# Patient Record
Sex: FEMALE | Race: WHITE | Employment: PART TIME | URBAN - METROPOLITAN AREA
[De-identification: names, ages, dates, MRNs, and addresses within clinical notes are randomized per-mention and may not be internally consistent; named-entity substitution may affect disease eponyms.]

---

## 2018-02-17 ENCOUNTER — OFFICE VISIT (OUTPATIENT)
Dept: URGENT CARE | Facility: URGENT CARE | Age: 38
End: 2018-02-17
Payer: COMMERCIAL

## 2018-02-17 VITALS
RESPIRATION RATE: 22 BRPM | SYSTOLIC BLOOD PRESSURE: 139 MMHG | TEMPERATURE: 101.1 F | WEIGHT: 200.25 LBS | DIASTOLIC BLOOD PRESSURE: 85 MMHG | BODY MASS INDEX: 36.63 KG/M2 | OXYGEN SATURATION: 100 % | HEART RATE: 106 BPM

## 2018-02-17 DIAGNOSIS — R50.9 FEVER AND CHILLS: ICD-10-CM

## 2018-02-17 DIAGNOSIS — J45.31 MILD PERSISTENT ASTHMA WITH EXACERBATION: ICD-10-CM

## 2018-02-17 DIAGNOSIS — B34.9 VIRAL ILLNESS: Primary | ICD-10-CM

## 2018-02-17 LAB
FLUAV+FLUBV AG SPEC QL: NEGATIVE
FLUAV+FLUBV AG SPEC QL: NEGATIVE
SPECIMEN SOURCE: NORMAL

## 2018-02-17 PROCEDURE — 87804 INFLUENZA ASSAY W/OPTIC: CPT | Performed by: NURSE PRACTITIONER

## 2018-02-17 PROCEDURE — 99203 OFFICE O/P NEW LOW 30 MIN: CPT | Performed by: NURSE PRACTITIONER

## 2018-02-17 RX ORDER — ALBUTEROL SULFATE 90 UG/1
1-2 AEROSOL, METERED RESPIRATORY (INHALATION) EVERY 6 HOURS PRN
Qty: 1 INHALER | Refills: 0 | Status: SHIPPED | OUTPATIENT
Start: 2018-02-17

## 2018-02-17 RX ORDER — DEXTROMETHORPHAN POLISTIREX 30 MG/5ML
60 SUSPENSION ORAL 2 TIMES DAILY
Qty: 148 ML | Refills: 0 | Status: SHIPPED | OUTPATIENT
Start: 2018-02-17

## 2018-02-17 NOTE — MR AVS SNAPSHOT
"              After Visit Summary   2/17/2018    Nevaeh Quiñonez    MRN: [id number]           Patient Information     Date Of Birth          1980        Visit Information        Provider Department      2/17/2018 12:45 PM Dionicio Mujica APRN [facility]        Today's Diagnoses     Viral illness    -  1    Fever and chills        Mild persistent asthma with exacerbation          Care Instructions      Viral Syndrome (Adult)  A viral illness may cause a number of symptoms. The symptoms depend on the part of the body that the virus affects. If it settles in your nose, throat, and lungs, it may cause cough, sore throat, congestion, and sometimes headache. If it settles in your stomach and intestinal tract, it may cause vomiting and diarrhea. Sometimes it causes vague symptoms like \"aching all over,\" feeling tired, loss of appetite, or fever.  A viral illness usually lasts 1 to 2 weeks, but sometimes it lasts longer. In some cases, a more serious infection can look like a viral syndrome in the first few days of the illness. You may need another exam and additional tests to know the difference. Watch for the warning signs listed below.  Home care  Follow these guidelines for taking care of yourself at home:    If symptoms are severe, rest at home for the first 2 to 3 days.    Stay away from cigarette smoke - both your smoke and the smoke from others.    You may use over-the-counter acetaminophen or ibuprofen for fever, muscle aching, and headache, unless another medicine was prescribed for this. If you have chronic liver or kidney disease or ever had a stomach ulcer or GI bleeding, talk with your doctor before using these medicines. No one who is younger than 18 and ill with a fever should take aspirin. It may cause severe disease or death.    Your appetite may be poor, so a light diet is fine. Avoid dehydration by drinking 8 to 12 8-ounce glasses of fluids each " day. This may include water; orange juice; lemonade; apple, grape, and cranberry juice; clear fruit drinks; electrolyte replacement and sports drinks; and decaffeinated teas and coffee. If you have been diagnosed with a kidney disease, ask your doctor how much and what types of fluids you should drink to prevent dehydration. If you have kidney disease, drinking too much fluid can cause it build up in the your body and be dangerous to your health.    Over-the-counter remedies won't shorten the length of the illness but may be helpful for cough, sore throat; and nasal and sinus congestion. Don't use decongestants if you have high blood pressure.  Follow-up care  Follow up with your healthcare provider if you do not improve over the next week.  Call 911  Get emergency medical care if any of the following occur:    Convulsion    Feeling weak, dizzy, or like you are going to faint    Chest pain, shortness of breath, wheezing, or difficulty breathing  When to seek medical advice  Call your healthcare provider right away if any of these occur:    Cough with lots of colored sputum (mucus) or blood in your sputum    Chest pain, shortness of breath, wheezing, or difficulty breathing    Severe headache; face, neck, or ear pain    Severe, constant pain in the lower right side of your belly (abdominal)    Continued vomiting (can t keep liquids down)    Frequent diarrhea (more than 5 times a day); blood (red or black color) or mucus in diarrhea    Feeling weak, dizzy, or like you are going to faint    Extreme thirst    Fever of 100.4 F (38 C) or higher, or as directed by your healthcare provider  Date Last Reviewed: 9/25/2015 2000-2017 [employer]. [address] [place] [address], [place] [address]. All rights reserved. This information is not intended as a substitute for professional medical care. Always follow your healthcare professional's instructions.                Follow-ups after your visit        Follow-up notes  "from your care team     Return if symptoms worsen or fail to improve, for follow up with PCP .      Who to contact     If you have questions or need follow up information about today's clinic visit or your schedule please contact [place] [facility] directly at [phone].  Normal or non-critical lab and imaging results will be communicated to you by MyChart, letter or phone within 4 business days after the clinic has received the results. If you do not hear from us within 7 days, please contact the clinic through MyChart or phone. If you have a critical or abnormal lab result, we will notify you by phone as soon as possible.  Submit refill requests through [employer] or call your pharmacy and they will forward the refill request to us. Please allow 3 business days for your refill to be completed.          Additional Information About Your Visit        MyChart Information     [employer] lets you send messages to your doctor, view your test results, renew your prescriptions, schedule appointments and more. To sign up, go to [email or web address] . Click on \"Log in\" on the left side of the screen, which will take you to the Welcome page. Then click on \"Sign up Now\" on the right side of the page.     You will be asked to enter the access code listed below, as well as some personal information. Please follow the directions to create your username and password.     Your access code is: [email or web address]  Expires: 2018  3:30 PM     Your access code will  in 90 days. If you need help or a new code, please call your [facility] clinic or [phone].        Care EveryWhere ID     This is your Care EveryWhere ID. This could be used by other organizations to access your [facility] medical records  [id number]        Your Vitals Were     Pulse Temperature Respirations Pulse Oximetry BMI (Body Mass Index)       106 101.1  F (38.4  C) (Oral) 22 100% 36.63 kg/m2        Blood Pressure from Last 3 Encounters: "   02/17/18 139/85   12/27/12 101/54   05/10/06 94/64    Weight from Last 3 Encounters:   02/17/18 200 lb 4 oz (90.8 kg)   12/27/12 180 lb (81.6 kg)   05/10/06 177 lb (80.3 kg)              We Performed the Following     Influenza A/B antigen          Today's Medication Changes          These changes are accurate as of 2/17/18  3:31 PM.  If you have any questions, ask your nurse or doctor.               Start taking these medicines.        Dose/Directions    albuterol 108 (90 BASE) MCG/ACT Inhaler   Commonly known as:  PROAIR HFA/PROVENTIL HFA/VENTOLIN HFA   Used for:  Viral illness, Mild persistent asthma with exacerbation   Started by:  Dionicio Mujica APRN CNP        Dose:  1-2 puff   Inhale 1-2 puffs into the lungs every 6 hours as needed for shortness of breath / dyspnea (cough)   Quantity:  1 Inhaler   Refills:  0       dextromethorphan 30 MG/5ML liquid   Commonly known as:  DELSYM   Used for:  Viral illness   Started by:  Dionicio Mujica APRN CNP        Dose:  60 mg   Take 10 mLs (60 mg) by mouth 2 times daily   Quantity:  148 mL   Refills:  0       naproxen 375 MG tablet   Commonly known as:  NAPROSYN   Used for:  Viral illness   Started by:  Dionicio Mujica APRN CNP        Dose:  375 mg   Take 1 tablet (375 mg) by mouth 2 times daily (with meals)   Quantity:  20 tablet   Refills:  0         Stop taking these medicines if you haven't already. Please contact your care team if you have questions.     guaiFENesin-codeine 100-10 MG/5ML Soln solution   Commonly known as:  ROBITUSSIN AC   Stopped by:  Dionicio Mujica APRN CNP           PREDNISONE PO   Stopped by:  Dionicio Mujica APRN CNP                Where to get your medicines      These medications were sent to [facility] [address] [address]     Phone:  [phone]     albuterol 108 (90 BASE) MCG/ACT Inhaler    dextromethorphan 30 MG/5ML  liquid    fluticasone-salmeterol 500-50 MCG/DOSE diskus inhaler    naproxen 375 MG tablet                Primary Care Provider Office Phone # Fax #    Betty Mays [phone]       [address] [address]        Equal Access to Services     ASHA TOVAR : Hadii aad ku hadasho Soomaali, waaxda luqadaha, qaybta kaalmada adeegyada, waxay shwethain hayaan aderenata xiangfabby payan . So [facility] [phone].    ATENCIÓN: Si habla español, tiene a swain disposición servicios gratuitos de asistencia lingüística. Kasandra al [phone].    We comply with applicable federal civil rights laws and [place] laws. We do not discriminate on the basis of race, color, national origin, age, disability, sex, sexual orientation, or gender identity.            Thank you!     Thank you for choosing [place] [facility]  for your care. Our goal is always to provide you with excellent care. Hearing back from our patients is one way we can continue to improve our services. Please take a few minutes to complete the written survey that you may receive in the mail after your visit with us. Thank you!             Your Updated Medication List - Protect others around you: Learn how to safely use, store and throw away your medicines at www.disposemymeds.org.          This list is accurate as of 2/17/18  3:31 PM.  Always use your most recent med list.                   Brand Name Dispense Instructions for use Diagnosis    acetaminophen 500 MG tablet    TYLENOL    1 tablet    Take 1-2 tablets by mouth every 6 hours as needed for pain.        albuterol 108 (90 BASE) MCG/ACT Inhaler    PROAIR HFA/PROVENTIL HFA/VENTOLIN HFA    1 Inhaler    Inhale 1-2 puffs into the lungs every 6 hours as needed for shortness of breath / dyspnea (cough)    Viral illness, Mild persistent asthma with exacerbation       albuterol 90 MCG/ACT inhaler     1    1-2 puffs Q 4-6 hrs prn    Supervision of other  normal pregnancy, Mild persistent asthma with exacerbation       dextromethorphan 30 MG/5ML liquid    DELSYM    148 mL    Take 10 mLs (60 mg) by mouth 2 times daily    Viral illness       fluticasone-salmeterol 500-50 MCG/DOSE diskus inhaler    ADVAIR    60 Inhaler    Inhale 1 puff into the lungs every 12 hours    Mild persistent asthma with exacerbation       ibuprofen 200 MG tablet    ADVIL/MOTRIN    1 tablet    Take 4 tablets by mouth every 8 hours as needed for pain.        METFORMIN HCL PO      Take  by mouth daily (with breakfast).        naproxen 375 MG tablet    NAPROSYN    20 tablet    Take 1 tablet (375 mg) by mouth 2 times daily (with meals)    Viral illness       PULMICORT IN      Inhale  into the lungs.

## 2018-02-17 NOTE — PROGRESS NOTES
SUBJECTIVE: Nevaeh Quiñonez is a 37 year old female who present complaining of flu-like symptoms: fevers, chills, myalgias, congestion, headache, sore throat, and cough for 4 days. Denies dyspnea or wheezing. Son with influenza positive.   LMP: 01/20/2018. Reports increased fatigue, decreased appetite, and disruption in sleep due to cough; denies any dysuria or diarrhea.    OBJECTIVE:  /85  Pulse 106  Temp 101.1  F (38.4  C) (Oral)  Resp 22  Wt 200 lb 4 oz (90.8 kg)  SpO2 100%  BMI 36.63 kg/m2  Appears moderately ill but not toxic; temperature as noted in vitals. Ears normal. Throat and pharynx normal.  Neck supple. No adenopathy in the neck. Sinuses non tender. CV: S1, S2 auscultated with regular rate and rhythm, no gallops, murmurs or rubs. Lungs: Bilateral lobes clear throughout without wheezes, crackles, or rhonchi noted.   Rapid Flu: negative    ASSESSMENT: Viral illness    PLAN: Discussed with patient causes for viral illnesses. Advised process for strep throat culture and further symptomatic cares with warm saltwater gargle, use acetaminophen or other OTC analgesic, fever control, and OTC saline nasal spray. Advised that there are no symptoms of pneumonia or ear infection and when to return re-evaluation.      Advised to return to follow up with PCP if symptoms persist or do not improve as discussed. Patient agreed to the plan of care.       Russell Mujica, APRN, CNP

## 2018-02-17 NOTE — LETTER
[facility]  [facility]    [address] [address]  Phone: [phone]    February 17, 2018      RE: Nevaeh Quiñonez  [address] [address] [id number]  [place] [address]       To whom it may concern:    Nevaeh Quiñonez was seen in our clinic today. She may return to work on 02/21/2018.      Sincerely,    Dionicio FARFAN, CNP

## 2018-02-17 NOTE — PATIENT INSTRUCTIONS

## 2018-06-16 ENCOUNTER — OFFICE VISIT (OUTPATIENT)
Dept: URGENT CARE | Facility: URGENT CARE | Age: 38
End: 2018-06-16
Payer: COMMERCIAL

## 2018-06-16 VITALS
WEIGHT: 210 LBS | SYSTOLIC BLOOD PRESSURE: 130 MMHG | HEART RATE: 72 BPM | RESPIRATION RATE: 20 BRPM | TEMPERATURE: 97.9 F | BODY MASS INDEX: 38.41 KG/M2 | DIASTOLIC BLOOD PRESSURE: 80 MMHG | OXYGEN SATURATION: 100 %

## 2018-06-16 DIAGNOSIS — J45.31 MILD PERSISTENT ASTHMA WITH ACUTE EXACERBATION: Primary | ICD-10-CM

## 2018-06-16 PROCEDURE — 99214 OFFICE O/P EST MOD 30 MIN: CPT | Mod: 25 | Performed by: PHYSICIAN ASSISTANT

## 2018-06-16 PROCEDURE — 94640 AIRWAY INHALATION TREATMENT: CPT | Performed by: PHYSICIAN ASSISTANT

## 2018-06-16 RX ORDER — PREDNISONE 20 MG/1
20 TABLET ORAL DAILY
Qty: 5 TABLET | Refills: 0 | Status: SHIPPED | OUTPATIENT
Start: 2018-06-16

## 2018-06-16 RX ORDER — ALBUTEROL SULFATE 90 UG/1
2 AEROSOL, METERED RESPIRATORY (INHALATION) EVERY 6 HOURS PRN
Qty: 1 INHALER | Refills: 0 | Status: SHIPPED | OUTPATIENT
Start: 2018-06-16

## 2018-06-16 RX ORDER — IPRATROPIUM BROMIDE AND ALBUTEROL SULFATE 2.5; .5 MG/3ML; MG/3ML
1 SOLUTION RESPIRATORY (INHALATION) ONCE
Qty: 1 VIAL | Refills: 0
Start: 2018-06-16 | End: 2018-06-16

## 2018-06-16 NOTE — PROGRESS NOTES
CHIEF COMPLAINT:   Chief Complaint   Patient presents with     Asthma     coughing,wheezing for past week,pt is out of inhaler     SUBJECTIVE:   Nevaeh Quiñonez is a 38 year old female seen urgently with exacerbation of asthma for 7 days. Wheezing is described as moderate and worse at night. Associated symptoms:allergy symptoms. Current asthma medications: Advair (ran out 5 days ago) and albuterol.  Patient denies smoking cigarettes.    Current Outpatient Prescriptions   Medication     acetaminophen (TYLENOL) 500 MG tablet     albuterol (PROAIR HFA/PROVENTIL HFA/VENTOLIN HFA) 108 (90 Base) MCG/ACT Inhaler     albuterol (PROAIR HFA/PROVENTIL HFA/VENTOLIN HFA) 108 (90 BASE) MCG/ACT Inhaler     ALBUTEROL 90 MCG/ACT IN AERS     Budesonide (PULMICORT IN)     dextromethorphan (DELSYM) 30 MG/5ML liquid     fluticasone-salmeterol (ADVAIR) 500-50 MCG/DOSE diskus inhaler     fluticasone-salmeterol (ADVAIR) 500-50 MCG/DOSE diskus inhaler     ibuprofen (ADVIL,MOTRIN) 200 MG tablet     ipratropium - albuterol 0.5 mg/2.5 mg/3 mL (DUONEB) 0.5-2.5 (3) MG/3ML neb solution     METFORMIN HCL PO     naproxen (NAPROSYN) 375 MG tablet     predniSONE (DELTASONE) 20 MG tablet     No current facility-administered medications for this visit.      Family History   Problem Relation Age of Onset     Respiratory Mother      Asthma     Respiratory Father      Asthma     Respiratory Brother      Asthma     Psychotic Disorder Brother      Drugs and ETOH     Social History     Social History     Marital status:      Spouse name: Arthur     Number of children: 1     Years of education: 11     Occupational History      Unemployed     Social History Main Topics     Smoking status: Never Smoker     Smokeless tobacco: Never Used     Alcohol use No     Drug use: No     Sexual activity: Yes     Partners: Male     Other Topics Concern     Not on file     Social History Narrative       OBJECTIVE:   GENERAL:  Alert. No acute distress. WD  WN  HEAD:  Normocephalic.Atramautic  EYES:  PERRLA.  EOMs are full.  Sclerae are clear. No discharge  EARS:  Normal canal without edema exudates or discharge. TM normal. No bulging or retraction  NOSE: Pink and Moist mucous membranes. No discharge B/L.  MOUTH/THROAT:  Oral hygene is good. Moist mucus membranes. No oral or pharyngeal lesions are seen. Oropharynx without exudates edema or erythema  NECK:  Supple.  No lymphadenopathy. ROM intact without nuchal rigidity.  LUNGS: Expiratory wheezing noted throughout  HEART:  Regular rhythm.  Normal rate.  No murmur  EXTREMITIES:  Warm, well perfused with good ROM and strength. No cyanosis or edema.     SKIN:  Warm and dry.  No rashes  NEUROLOGIC:  Normal tone      ASSESSMENT:   Asthma - acute exacerbation    PLAN:   oximetry on room air was 100%, gave duo neb in office  after office therapy, chest was clear    RX per orders - Use bronchodilator MDI 2 puff q4h prn, steroid MDI regularly to prevent asthma and oral steroid taper.    Additional suggestions to patient: push fluids and rest.    Deya Modi PA-C

## 2018-06-16 NOTE — MR AVS SNAPSHOT
After Visit Summary   6/16/2018    Nevaeh Quiñonez    MRN: [id number]           Patient Information     Date Of Birth          1980        Visit Information        Provider Department      6/16/2018 2:40 PM Deya Modi PA-C [facility]        Today's Diagnoses     Mild persistent asthma with acute exacerbation    -  1       Follow-ups after your visit        Who to contact     If you have questions or need follow up information about today's clinic visit or your schedule please contact [facility] directly at [phone].  Normal or non-critical lab and imaging results will be communicated to you by MyChart, letter or phone within 4 business days after the clinic has received the results. If you do not hear from us within 7 days, please contact the clinic through MyChart or phone. If you have a critical or abnormal lab result, we will notify you by phone as soon as possible.  Submit refill requests through goBramble or call your pharmacy and they will forward the refill request to us. Please allow 3 business days for your refill to be completed.          Additional Information About Your Visit        Care EveryWhere ID     This is your Care EveryWhere ID. This could be used by other organizations to access your [place] medical records  [id number]        Your Vitals Were     Pulse Temperature Respirations Pulse Oximetry BMI (Body Mass Index)       72 97.9  F (36.6  C) (Oral) 20 100% 38.41 kg/m2        Blood Pressure from Last 3 Encounters:   06/16/18 130/80   02/17/18 139/85   12/27/12 101/54    Weight from Last 3 Encounters:   06/16/18 210 lb (95.3 kg)   02/17/18 200 lb 4 oz (90.8 kg)   12/27/12 180 lb (81.6 kg)              We Performed the Following     ALBUTEROL UNIT DOSE, 1 MG -      ALBUTEROL/IPRATROPIUM 3ML NEB     INHALATION/NEBULIZER TREATMENT, INITIAL          Today's Medication Changes          These  changes are accurate as of 6/16/18  4:57 PM.  If you have any questions, ask your nurse or doctor.               Start taking these medicines.        Dose/Directions    ipratropium - albuterol 0.5 mg/2.5 mg/3 mL 0.5-2.5 (3) MG/3ML neb solution   Commonly known as:  DUONEB   Used for:  Mild persistent asthma with acute exacerbation   Started by:  Deya Modi PA-C        Dose:  1 vial   Take 1 vial (3 mLs) by nebulization once for 1 dose   Quantity:  1 vial   Refills:  0       predniSONE 20 MG tablet   Commonly known as:  DELTASONE   Used for:  Mild persistent asthma with acute exacerbation   Started by:  Deya Modi PA-C        Dose:  20 mg   Take 1 tablet (20 mg) by mouth daily   Quantity:  5 tablet   Refills:  0         These medicines have changed or have updated prescriptions.        Dose/Directions    * albuterol 108 (90 Base) MCG/ACT Inhaler   Commonly known as:  PROAIR HFA/PROVENTIL HFA/VENTOLIN HFA   This may have changed:  Another medication with the same name was added. Make sure you understand how and when to take each.   Used for:  Viral illness, Mild persistent asthma with exacerbation   Changed by:  Deya Modi PA-C        Dose:  1-2 puff   Inhale 1-2 puffs into the lungs every 6 hours as needed for shortness of breath / dyspnea (cough)   Quantity:  1 Inhaler   Refills:  0       * albuterol 108 (90 Base) MCG/ACT Inhaler   Commonly known as:  PROAIR HFA/PROVENTIL HFA/VENTOLIN HFA   This may have changed:  You were already taking a medication with the same name, and this prescription was added. Make sure you understand how and when to take each.   Used for:  Mild persistent asthma with acute exacerbation   Changed by:  Deya Modi PA-C        Dose:  2 puff   Inhale 2 puffs into the lungs every 6 hours as needed for shortness of breath / dyspnea or wheezing   Quantity:  1 Inhaler   Refills:  0       * fluticasone-salmeterol 500-50 MCG/DOSE diskus inhaler    Commonly known as:  ADVAIR   This may have changed:  Another medication with the same name was added. Make sure you understand how and when to take each.   Used for:  Mild persistent asthma with exacerbation   Changed by:  Deya Modi PA-C        Dose:  1 puff   Inhale 1 puff into the lungs every 12 hours   Quantity:  60 Inhaler   Refills:  0       * fluticasone-salmeterol 500-50 MCG/DOSE diskus inhaler   Commonly known as:  ADVAIR   This may have changed:  You were already taking a medication with the same name, and this prescription was added. Make sure you understand how and when to take each.   Used for:  Mild persistent asthma with acute exacerbation   Changed by:  Deya Modi PA-C        Dose:  1 puff   Inhale 1 puff into the lungs 2 times daily   Quantity:  1 Inhaler   Refills:  1       * Notice:  This list has 4 medication(s) that are the same as other medications prescribed for you. Read the directions carefully, and ask your doctor or other care provider to review them with you.         Where to get your medicines      These medications were sent to [address] [address]     Phone:  [phone]     albuterol 108 (90 Base) MCG/ACT Inhaler    fluticasone-salmeterol 500-50 MCG/DOSE diskus inhaler    predniSONE 20 MG tablet         Some of these will need a paper prescription and others can be bought over the counter.  Ask your nurse if you have questions.     You don't need a prescription for these medications     ipratropium - albuterol 0.5 mg/2.5 mg/3 mL 0.5-2.5 (3) MG/3ML neb solution                Primary Care Provider Fax #    Physician No Ref-Primary [phone]       No address on file        Equal Access to Services     ASHA TOVAR : Mandie Taylor, andrés nugent, awais peng, sharyn sibley. So [facility] [phone].    ATENCIÓN: Otoniel moreland  español, tiene a swain disposición servicios gratuitos de asistencia lingüística. Kasandra salgado [phone].    We comply with applicable federal civil rights laws and [place] laws. We do not discriminate on the basis of race, color, national origin, age, disability, sex, sexual orientation, or gender identity.            Thank you!     Thank you for choosing [facility]  for your care. Our goal is always to provide you with excellent care. Hearing back from our patients is one way we can continue to improve our services. Please take a few minutes to complete the written survey that you may receive in the mail after your visit with us. Thank you!             Your Updated Medication List - Protect others around you: Learn how to safely use, store and throw away your medicines at www.disposemymeds.org.          This list is accurate as of 6/16/18  4:57 PM.  Always use your most recent med list.                   Brand Name Dispense Instructions for use Diagnosis    acetaminophen 500 MG tablet    TYLENOL    1 tablet    Take 1-2 tablets by mouth every 6 hours as needed for pain.        * albuterol 108 (90 Base) MCG/ACT Inhaler    PROAIR HFA/PROVENTIL HFA/VENTOLIN HFA    1 Inhaler    Inhale 1-2 puffs into the lungs every 6 hours as needed for shortness of breath / dyspnea (cough)    Viral illness, Mild persistent asthma with exacerbation       * albuterol 108 (90 Base) MCG/ACT Inhaler    PROAIR HFA/PROVENTIL HFA/VENTOLIN HFA    1 Inhaler    Inhale 2 puffs into the lungs every 6 hours as needed for shortness of breath / dyspnea or wheezing    Mild persistent asthma with acute exacerbation       albuterol 90 MCG/ACT inhaler     1    1-2 puffs Q 4-6 hrs prn    Supervision of other normal pregnancy, Mild persistent asthma with exacerbation       dextromethorphan 30 MG/5ML liquid    DELSYM    148 mL    Take 10 mLs (60 mg) by mouth 2 times daily    Viral illness       * fluticasone-salmeterol 500-50  MCG/DOSE diskus inhaler    ADVAIR    60 Inhaler    Inhale 1 puff into the lungs every 12 hours    Mild persistent asthma with exacerbation       * fluticasone-salmeterol 500-50 MCG/DOSE diskus inhaler    ADVAIR    1 Inhaler    Inhale 1 puff into the lungs 2 times daily    Mild persistent asthma with acute exacerbation       ibuprofen 200 MG tablet    ADVIL/MOTRIN    1 tablet    Take 4 tablets by mouth every 8 hours as needed for pain.        ipratropium - albuterol 0.5 mg/2.5 mg/3 mL 0.5-2.5 (3) MG/3ML neb solution    DUONEB    1 vial    Take 1 vial (3 mLs) by nebulization once for 1 dose    Mild persistent asthma with acute exacerbation       METFORMIN HCL PO      Take  by mouth daily (with breakfast).        naproxen 375 MG tablet    NAPROSYN    20 tablet    Take 1 tablet (375 mg) by mouth 2 times daily (with meals)    Viral illness       predniSONE 20 MG tablet    DELTASONE    5 tablet    Take 1 tablet (20 mg) by mouth daily    Mild persistent asthma with acute exacerbation       PULMICORT IN      Inhale  into the lungs.        * Notice:  This list has 4 medication(s) that are the same as other medications prescribed for you. Read the directions carefully, and ask your doctor or other care provider to review them with you.

## 2022-02-03 ENCOUNTER — HOSPITAL ENCOUNTER (EMERGENCY)
Facility: CLINIC | Age: 42
Discharge: HOME | End: 2022-02-03
Attending: EMERGENCY MEDICINE | Admitting: EMERGENCY MEDICINE
Payer: COMMERCIAL

## 2022-02-03 ENCOUNTER — APPOINTMENT (OUTPATIENT)
Dept: ULTRASOUND IMAGING | Facility: CLINIC | Age: 42
End: 2022-02-03
Attending: EMERGENCY MEDICINE
Payer: COMMERCIAL

## 2022-02-03 VITALS
BODY MASS INDEX: 39.75 KG/M2 | HEIGHT: 62 IN | WEIGHT: 216 LBS | DIASTOLIC BLOOD PRESSURE: 76 MMHG | RESPIRATION RATE: 16 BRPM | SYSTOLIC BLOOD PRESSURE: 137 MMHG | HEART RATE: 75 BPM | TEMPERATURE: 98.3 F | OXYGEN SATURATION: 100 %

## 2022-02-03 DIAGNOSIS — I82.452 ACUTE DEEP VEIN THROMBOSIS (DVT) OF LEFT PERONEAL VEIN (H): ICD-10-CM

## 2022-02-03 LAB
ERYTHROCYTE [DISTWIDTH] IN BLOOD BY AUTOMATED COUNT: 13.4 % (ref 10–15)
HCT VFR BLD AUTO: 42 % (ref 35–47)
HGB BLD-MCNC: 13.2 G/DL (ref 11.7–15.7)
MCH RBC QN AUTO: 27.6 PG (ref 26.5–33)
MCHC RBC AUTO-ENTMCNC: 31.4 G/DL (ref 31.5–36.5)
MCV RBC AUTO: 88 FL (ref 78–100)
PLATELET # BLD AUTO: 335 10E3/UL (ref 150–450)
RBC # BLD AUTO: 4.78 10E6/UL (ref 3.8–5.2)
WBC # BLD AUTO: 11.7 10E3/UL (ref 4–11)

## 2022-02-03 PROCEDURE — 250N000013 HC RX MED GY IP 250 OP 250 PS 637: Performed by: EMERGENCY MEDICINE

## 2022-02-03 PROCEDURE — 85014 HEMATOCRIT: CPT | Performed by: EMERGENCY MEDICINE

## 2022-02-03 PROCEDURE — 93971 EXTREMITY STUDY: CPT | Mod: LT

## 2022-02-03 PROCEDURE — 58301 REMOVE INTRAUTERINE DEVICE: CPT

## 2022-02-03 PROCEDURE — 36415 COLL VENOUS BLD VENIPUNCTURE: CPT | Performed by: EMERGENCY MEDICINE

## 2022-02-03 PROCEDURE — 99284 EMERGENCY DEPT VISIT MOD MDM: CPT | Mod: 25

## 2022-02-03 RX ORDER — ACETAMINOPHEN 500 MG
1000 TABLET ORAL ONCE
Status: COMPLETED | OUTPATIENT
Start: 2022-02-03 | End: 2022-02-03

## 2022-02-03 RX ORDER — APIXABAN 5 MG (74)
KIT ORAL
Qty: 51 EACH | Refills: 0 | Status: SHIPPED | OUTPATIENT
Start: 2022-02-03 | End: 2022-03-05

## 2022-02-03 RX ADMIN — ACETAMINOPHEN 1000 MG: 500 TABLET ORAL at 14:21

## 2022-02-03 RX ADMIN — APIXABAN 10 MG: 5 TABLET, FILM COATED ORAL at 14:21

## 2022-02-03 ASSESSMENT — MIFFLIN-ST. JEOR: SCORE: 1588.02
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
## 2022-02-03 NOTE — ED TRIAGE NOTES
Pt complains of left leg pain since the 26th after she had an IUD placed - she does not know if it is related to that  - has factor five. Vss,.